# Patient Record
Sex: MALE | Race: WHITE | Employment: UNEMPLOYED | URBAN - METROPOLITAN AREA
[De-identification: names, ages, dates, MRNs, and addresses within clinical notes are randomized per-mention and may not be internally consistent; named-entity substitution may affect disease eponyms.]

---

## 2018-05-23 ENCOUNTER — NURSE ONLY (OUTPATIENT)
Dept: NON INVASIVE DIAGNOSTICS | Age: 58
End: 2018-05-23
Payer: COMMERCIAL

## 2018-05-23 ENCOUNTER — OFFICE VISIT (OUTPATIENT)
Dept: CARDIOLOGY CLINIC | Age: 58
End: 2018-05-23
Payer: COMMERCIAL

## 2018-05-23 VITALS
HEART RATE: 77 BPM | DIASTOLIC BLOOD PRESSURE: 84 MMHG | RESPIRATION RATE: 16 BRPM | WEIGHT: 282 LBS | SYSTOLIC BLOOD PRESSURE: 132 MMHG | BODY MASS INDEX: 39.48 KG/M2 | HEIGHT: 71 IN

## 2018-05-23 DIAGNOSIS — Z95.0 PACEMAKER: ICD-10-CM

## 2018-05-23 DIAGNOSIS — Z92.89 HISTORY OF EXERCISE STRESS TEST: ICD-10-CM

## 2018-05-23 DIAGNOSIS — E78.2 MIXED HYPERLIPIDEMIA: ICD-10-CM

## 2018-05-23 DIAGNOSIS — Z92.89 HISTORY OF ECHOCARDIOGRAM: ICD-10-CM

## 2018-05-23 DIAGNOSIS — I10 ESSENTIAL HYPERTENSION: ICD-10-CM

## 2018-05-23 DIAGNOSIS — Z95.0 PACEMAKER: Primary | ICD-10-CM

## 2018-05-23 PROCEDURE — 93000 ELECTROCARDIOGRAM COMPLETE: CPT | Performed by: INTERNAL MEDICINE

## 2018-05-23 PROCEDURE — 99204 OFFICE O/P NEW MOD 45 MIN: CPT | Performed by: INTERNAL MEDICINE

## 2018-05-23 PROCEDURE — 93280 PM DEVICE PROGR EVAL DUAL: CPT | Performed by: INTERNAL MEDICINE

## 2018-05-23 RX ORDER — ROSUVASTATIN CALCIUM 10 MG/1
5 TABLET, COATED ORAL DAILY
COMMUNITY

## 2018-05-23 RX ORDER — TAMSULOSIN HYDROCHLORIDE 0.4 MG/1
0.4 CAPSULE ORAL DAILY
COMMUNITY
Start: 2018-05-13

## 2018-05-23 RX ORDER — M-VIT,TX,IRON,MINS/CALC/FOLIC 27MG-0.4MG
1 TABLET ORAL DAILY
COMMUNITY

## 2018-10-19 NOTE — PROGRESS NOTES
any questions or concerns. I have spent a total of 45 minutes with the patient and the family reviewing the above stated recommendations. And a total of >50% of that time involved face-to-face time providing counseling and or coordination of care with the other providers. Thank you for allowing me to participate in your patient's care. Please call me if there are any questions or concerns.       Luis M Lunsford MD  Cardiac Electrophysiology  [facility]) Physicians  [facility] [place]: [facility]  8:30 AM  10/22/2018

## 2018-10-22 ENCOUNTER — OFFICE VISIT (OUTPATIENT)
Dept: NON INVASIVE DIAGNOSTICS | Age: 58
End: 2018-10-22
Payer: COMMERCIAL

## 2018-10-22 VITALS
DIASTOLIC BLOOD PRESSURE: 84 MMHG | WEIGHT: 281.6 LBS | HEIGHT: 71 IN | SYSTOLIC BLOOD PRESSURE: 128 MMHG | RESPIRATION RATE: 18 BRPM | BODY MASS INDEX: 39.42 KG/M2

## 2018-10-22 DIAGNOSIS — Z95.0 PACEMAKER: Primary | ICD-10-CM

## 2018-10-22 PROCEDURE — 99204 OFFICE O/P NEW MOD 45 MIN: CPT | Performed by: INTERNAL MEDICINE

## 2018-10-22 PROCEDURE — 93280 PM DEVICE PROGR EVAL DUAL: CPT | Performed by: INTERNAL MEDICINE

## 2019-06-03 NOTE — PROGRESS NOTES
[address] [facility] and [address] [facility] Electrophysiology  Consultation Report  PATIENT: Ian Gutierrez  MEDICAL RECORD NUMBER: <[id number]>  DATE OF SERVICE:  6/6/2019  ATTENDING ELECTROPHYSIOLOGIST: Cecil Petty MD  REFERRING PHYSICIAN: Cherry Magana MD  CHIEF COMPLAINT: Pacemaker in-situ management    HPI: This is a 62 y.o. male with a history of   Patient Active Problem List   Diagnosis    Essential hypertension    Mixed hyperlipidemia    History of echocardiogram    History of exercise stress test    Pacemaker   who presents to cardiac electrophysiology clinic for consultation of dual chamber permanent pacemaker management. The patient has had history of symptomatic sinus node dysfunction status post dual chamber permanent pacemaker implantation on 4/3/04 and generator change on 4/27/12. Since his last office visit Ms. Umanzor states he feels overall well. He offers no complaints from a device POV - his device site looks well healed and free from infection or erosion. He states states since the PPM insertion he denies any syncopal or dizzy episodes. The patient denies any chest pain, dyspnea, palpitations, dizziness, syncope, orthopnea or paroxysmal nocturnal dyspnea. Patient Active Problem List    Diagnosis Date Noted    Essential hypertension 05/23/2018    Mixed hyperlipidemia 05/23/2018    History of echocardiogram 05/23/2018     Overview Note:     A. Echo 4/2017 OSH): EF 55%, no valve disease      History of exercise stress test 05/23/2018     Overview Note:     A. Negative ETT-^ 2016 (OSH)      Pacemaker 05/23/2018     Overview Note:     A.  Original implant 2004,   Medtronic Adapta upgrade 4/27/2012 (OSH): for \"symptomatic 4.5 second pause\"         Past Medical History:   Diagnosis Date    Sleep apnea        Family History   Problem Relation Age of Onset    Cancer Mother         breast, mets    Heart Attack Father        Social History     Tobacco Use    Smoking status: Former Smoker     Packs/day: 2.50     Years: 32.00     Pack years: 80.00     Types: Cigarettes     Last attempt to quit: 5/23/2004     Years since quitting: 15.0    Smokeless tobacco: Never Used   Substance Use Topics    Alcohol use: Yes     Alcohol/week: 1.8 oz     Types: 3 Cans of beer per week     Comment: qod       Current Outpatient Medications   Medication Sig Dispense Refill    tamsulosin (FLOMAX) 0.4 MG capsule Take 0.4 mg by mouth daily       aspirin 81 MG tablet Take 81 mg by mouth daily      rosuvastatin (CRESTOR) 10 MG tablet Take 5 mg by mouth daily       Multiple Vitamins-Minerals (THERAPEUTIC MULTIVITAMIN-MINERALS) tablet Take 1 tablet by mouth daily       No current facility-administered medications for this visit. No Known Allergies    ROS:   Constitutional: Negative for fever, activity change and appetite change. HENT: Negative for epistaxis. Eyes: Negative for diploplia, blurred vision. Respiratory: Negative for cough, chest tightness, shortness of breath and wheezing. Cardiovascular: pertinent positives in HPI  Gastrointestinal: Negative for abdominal pain and blood in stool. All other review of systems are negative     PHYSICAL EXAM:   Vitals:    06/06/19 0833   BP: 122/80   Pulse: 91   Resp: 20   Weight: 293 lb (132.9 kg)   Height: 5' 10\" (1.778 m)      Constitutional: Well-developed, no acute distress  Eyes: conjunctivae normal, no xanthelasma   Ears, Nose, Throat: oral mucosa moist, no cyanosis   CV: no JVD. Regular rate and rhythm. Normal S1S2 and no S3. No murmurs, rubs, or gallops. PMI is nondisplaced  Lungs: clear to auscultation bilaterally, normal respiratory effort without used of accessory muscles  Abdomen: soft, non-tender, bowel sounds present, no masses or hepatomegaly   Musculoskeletal: no digital clubbing, no edema   Skin: warm, no rashes   Pacemaker pocket: well healed.  No erosion or infection or migration    I have personally encourage to call for any questions or concerns. I have spent a total of 30 minutes with the patient and the family reviewing the above stated recommendations. And a total of >50% of that time involved face-to-face time providing counseling and or coordination of care with the other providers. Thank you for allowing me to participate in your patient's care. Please call me if there are any questions or concerns.       Antonette Maxwell MD  Cardiac Electrophysiology  [address]   [facility] [place]: [facility]  6/6/19   8:45 AM

## 2019-06-06 ENCOUNTER — OFFICE VISIT (OUTPATIENT)
Dept: NON INVASIVE DIAGNOSTICS | Age: 59
End: 2019-06-06
Payer: COMMERCIAL

## 2019-06-06 VITALS
RESPIRATION RATE: 20 BRPM | BODY MASS INDEX: 41.95 KG/M2 | WEIGHT: 293 LBS | DIASTOLIC BLOOD PRESSURE: 80 MMHG | HEART RATE: 91 BPM | SYSTOLIC BLOOD PRESSURE: 122 MMHG | HEIGHT: 70 IN

## 2019-06-06 DIAGNOSIS — Z95.0 PACEMAKER: ICD-10-CM

## 2019-06-06 PROCEDURE — G8427 DOCREV CUR MEDS BY ELIG CLIN: HCPCS | Performed by: INTERNAL MEDICINE

## 2019-06-06 PROCEDURE — 99214 OFFICE O/P EST MOD 30 MIN: CPT | Performed by: INTERNAL MEDICINE

## 2019-06-06 PROCEDURE — 93280 PM DEVICE PROGR EVAL DUAL: CPT | Performed by: INTERNAL MEDICINE

## 2019-06-06 PROCEDURE — G8417 CALC BMI ABV UP PARAM F/U: HCPCS | Performed by: INTERNAL MEDICINE

## 2019-06-06 PROCEDURE — 1036F TOBACCO NON-USER: CPT | Performed by: INTERNAL MEDICINE

## 2019-06-06 PROCEDURE — 3017F COLORECTAL CA SCREEN DOC REV: CPT | Performed by: INTERNAL MEDICINE

## 2019-09-06 ENCOUNTER — TELEPHONE (OUTPATIENT)
Dept: NON INVASIVE DIAGNOSTICS | Age: 59
End: 2019-09-06

## 2019-09-12 ENCOUNTER — NURSE ONLY (OUTPATIENT)
Dept: NON INVASIVE DIAGNOSTICS | Age: 59
End: 2019-09-12
Payer: COMMERCIAL

## 2019-09-12 DIAGNOSIS — Z95.0 PACEMAKER: Primary | ICD-10-CM

## 2019-09-12 PROCEDURE — 93296 REM INTERROG EVL PM/IDS: CPT | Performed by: INTERNAL MEDICINE

## 2019-09-12 PROCEDURE — 93294 REM INTERROG EVL PM/LDLS PM: CPT | Performed by: INTERNAL MEDICINE

## 2019-12-17 ENCOUNTER — NURSE ONLY (OUTPATIENT)
Dept: NON INVASIVE DIAGNOSTICS | Age: 59
End: 2019-12-17
Payer: COMMERCIAL

## 2019-12-17 PROCEDURE — 93296 REM INTERROG EVL PM/IDS: CPT | Performed by: INTERNAL MEDICINE

## 2019-12-17 PROCEDURE — 93294 REM INTERROG EVL PM/LDLS PM: CPT | Performed by: INTERNAL MEDICINE

## 2020-01-14 ENCOUNTER — TELEPHONE (OUTPATIENT)
Dept: NON INVASIVE DIAGNOSTICS | Age: 60
End: 2020-01-14

## 2020-01-14 NOTE — TELEPHONE ENCOUNTER
We have received your remote transmission. Our staff will contact you if there is anything that needs to be discussed. Your next appointment is March 17, 2020 for remote transmission. Patient verbalized understanding.

## 2020-03-18 ENCOUNTER — NURSE ONLY (OUTPATIENT)
Dept: NON INVASIVE DIAGNOSTICS | Age: 60
End: 2020-03-18
Payer: COMMERCIAL

## 2020-03-18 PROCEDURE — 93294 REM INTERROG EVL PM/LDLS PM: CPT | Performed by: INTERNAL MEDICINE

## 2020-03-18 PROCEDURE — 93296 REM INTERROG EVL PM/IDS: CPT | Performed by: INTERNAL MEDICINE

## 2020-03-27 NOTE — PROGRESS NOTES
See [employer] [facility] report. Remote monitoring reviewed over a 90 day period. End of 90 day monitoring period date of service 3-18-20.

## 2020-03-30 ENCOUNTER — TELEPHONE (OUTPATIENT)
Dept: NON INVASIVE DIAGNOSTICS | Age: 60
End: 2020-03-30

## 2020-03-30 NOTE — TELEPHONE ENCOUNTER
Spoke with the patient. We have received your remote transmission. Our staff will contact you if there is anything that needs to be discussed. Your next appointment is June 17, 2020 for remote transmission. Voiced understanding.

## 2020-06-17 ENCOUNTER — NURSE ONLY (OUTPATIENT)
Dept: NON INVASIVE DIAGNOSTICS | Age: 60
End: 2020-06-17
Payer: COMMERCIAL

## 2020-06-17 PROCEDURE — 93294 REM INTERROG EVL PM/LDLS PM: CPT | Performed by: INTERNAL MEDICINE

## 2020-06-17 PROCEDURE — 93296 REM INTERROG EVL PM/IDS: CPT | Performed by: INTERNAL MEDICINE

## 2020-06-18 ENCOUNTER — TELEPHONE (OUTPATIENT)
Dept: NON INVASIVE DIAGNOSTICS | Age: 60
End: 2020-06-18

## 2021-01-18 ENCOUNTER — TELEPHONE (OUTPATIENT)
Dept: NON INVASIVE DIAGNOSTICS | Age: 61
End: 2021-01-18

## 2021-01-18 NOTE — TELEPHONE ENCOUNTER
Patient was due 09/22/2020 and has not yet set a remote transmission to the office. Mailed the patient a letter to remote them to hook up their monitor and send us a remote transmission.

## 2021-03-10 ENCOUNTER — OFFICE VISIT (OUTPATIENT)
Dept: NON INVASIVE DIAGNOSTICS | Age: 61
End: 2021-03-10

## 2021-03-10 VITALS
OXYGEN SATURATION: 95 % | BODY MASS INDEX: 39.93 KG/M2 | RESPIRATION RATE: 18 BRPM | HEART RATE: 104 BPM | HEIGHT: 71 IN | DIASTOLIC BLOOD PRESSURE: 86 MMHG | WEIGHT: 285.2 LBS | SYSTOLIC BLOOD PRESSURE: 138 MMHG

## 2021-03-10 DIAGNOSIS — I49.5 SINUS NODE DYSFUNCTION (HCC): ICD-10-CM

## 2021-03-10 DIAGNOSIS — R55 NEAR SYNCOPE: ICD-10-CM

## 2021-03-10 DIAGNOSIS — R42 LIGHTHEADED: Primary | ICD-10-CM

## 2021-03-10 PROCEDURE — 99213 OFFICE O/P EST LOW 20 MIN: CPT | Performed by: NURSE PRACTITIONER

## 2021-03-10 PROCEDURE — 93280 PM DEVICE PROGR EVAL DUAL: CPT | Performed by: NURSE PRACTITIONER

## 2021-03-10 NOTE — PROGRESS NOTES
[address] [facility],[address] and [address] [facility] Electrophysiology  Consultation Report  PATIENT: Ro Verdin  MEDICAL RECORD NUMBER: <[id number]>  DATE OF SERVICE:  3/10/2021  ATTENDING ELECTROPHYSIOLOGIST: Cinthya Concepcion MD  REFERRING PHYSICIAN: Cristiana Cole MD  CHIEF COMPLAINT: Pacemaker in-situ management    HPI: This is a 61 y.o. male with a history of   Patient Active Problem List   Diagnosis    Essential hypertension    Mixed hyperlipidemia    History of echocardiogram    History of exercise stress test    Pacemaker   who presents to cardiac electrophysiology clinic for Follow-up of dual chamber permanent pacemaker management secondary to sinus node dysfunction. He was last seen in Clinic with Dr. Darren Ventura on 06/06/19 and was doing well at that time. Today he complaints of malfunctioning of the Medtronic remote as well as lightheadedness, fogginess with exertion this has been persistent for approximately 6 months and is not worsening in frequency or severity, he has no complaints of chest pain, full syncope, orthopnea, bilateral lower extremity edema. The device site is well-healed without erosion or migration. He is atrial pacing 17% of the time, less than 1% RV pacing. Patient Active Problem List    Diagnosis Date Noted    Essential hypertension 05/23/2018    Mixed hyperlipidemia 05/23/2018    History of echocardiogram 05/23/2018     Overview Note:     A. Echo 4/2017 OSH): EF 55%, no valve disease      History of exercise stress test 05/23/2018     Overview Note:     A. Negative ETT-^ 2016 (OSH)      Pacemaker 05/23/2018     Overview Note:     A.  Original implant 2004,   Medtronic Adapta upgrade 4/27/2012 (OSH): for \"symptomatic 4.5 second pause\"         Past Medical History:   Diagnosis Date    Sleep apnea        Family History   Problem Relation Age of Onset    Cancer Mother         breast, mets    Heart Attack Father        Social History     Tobacco Use    Smoking status: Former Smoker     Packs/day: 2.50     Years: 32.00     Pack years: 80.00     Types: Cigarettes     Quit date: 2004     Years since quittin.8    Smokeless tobacco: Never Used   Substance Use Topics    Alcohol use: Yes     Alcohol/week: 3.0 standard drinks     Types: 3 Cans of beer per week     Comment: qod       Current Outpatient Medications   Medication Sig Dispense Refill    tamsulosin (FLOMAX) 0.4 MG capsule Take 0.4 mg by mouth daily       aspirin 81 MG tablet Take 81 mg by mouth daily      rosuvastatin (CRESTOR) 10 MG tablet Take 5 mg by mouth daily       Multiple Vitamins-Minerals (THERAPEUTIC MULTIVITAMIN-MINERALS) tablet Take 1 tablet by mouth daily       No current facility-administered medications for this visit. No Known Allergies    ROS:   Constitutional: Negative for fever, activity change and appetite change. HENT: Negative for epistaxis. Eyes: Negative for diploplia, blurred vision. Respiratory: Negative for cough, chest tightness, shortness of breath and wheezing. Cardiovascular: pertinent positives in HPI  Gastrointestinal: Negative for abdominal pain and blood in stool. All other review of systems are negative     PHYSICAL EXAM:   There were no vitals filed for this visit. Constitutional: Well-developed, no acute distress  Eyes: conjunctivae normal, no xanthelasma   Ears, Nose, Throat: oral mucosa moist, no cyanosis   CV: no JVD. Regular rate and rhythm. Normal S1S2 and no S3. No murmurs, rubs, or gallops. PMI is nondisplaced  Lungs: clear to auscultation bilaterally, normal respiratory effort without used of accessory muscles  Abdomen: soft, non-tender, bowel sounds present, no masses or hepatomegaly   Musculoskeletal: no digital clubbing, no edema   Skin: warm, no rashes   Pacemaker pocket: well healed.  No erosion or infection or migration    I have personally reviewed the laboratory, cardiac diagnostic and radiographic testing as months with Dr. Allison Hart and encourage to call for any questions or concerns. I have spent a total of 25 minutes with the patient and the family reviewing the above stated recommendations. And a total of >50% of that time involved face-to-face time providing counseling and or coordination of care with the other providers. Thank you for allowing me to participate in your patient's care. Please call me if there are any questions or concerns.       Electronically signed by Bayard Brittle, APRN - CNP on 3/10/2021 at 10:05 AM   Cardiac Electrophysiology  [address]  [facility] [place]: [facility]  3/10/21   10:04 AM

## 2021-05-17 ENCOUNTER — TELEPHONE (OUTPATIENT)
Dept: CARDIOLOGY | Age: 61
End: 2021-05-17

## 2021-05-17 NOTE — TELEPHONE ENCOUNTER
CALLED PATIENT AND LEFT MESSAGE TO RESCHEDULE ECHO THAT WAS SCHEDULED FOR THIS MORNING.  PATIENT WAS A NO SHOW    Electronically signed by Jose R Clinton on 5/17/2021 at 9:13 AM

## 2021-06-04 ENCOUNTER — TELEPHONE (OUTPATIENT)
Dept: CARDIOLOGY | Age: 61
End: 2021-06-04

## 2021-06-07 ENCOUNTER — HOSPITAL ENCOUNTER (OUTPATIENT)
Dept: CARDIOLOGY | Age: 61
Discharge: HOME | End: 2021-06-07
Payer: COMMERCIAL

## 2021-06-07 DIAGNOSIS — R42 LIGHTHEADED: ICD-10-CM

## 2021-06-07 DIAGNOSIS — R55 NEAR SYNCOPE: ICD-10-CM

## 2021-06-07 LAB
LV EF: 65 %
LVEF MODALITY: NORMAL

## 2021-06-07 PROCEDURE — 93306 TTE W/DOPPLER COMPLETE: CPT | Performed by: PSYCHIATRY & NEUROLOGY

## 2021-06-08 ENCOUNTER — TELEPHONE (OUTPATIENT)
Dept: NON INVASIVE DIAGNOSTICS | Age: 61
End: 2021-06-08

## 2021-12-15 ENCOUNTER — OFFICE VISIT (OUTPATIENT)
Dept: PODIATRY | Age: 61
End: 2021-12-15
Payer: COMMERCIAL

## 2021-12-15 VITALS — WEIGHT: 285 LBS | BODY MASS INDEX: 39.9 KG/M2 | HEIGHT: 71 IN

## 2021-12-15 DIAGNOSIS — R26.2 DIFFICULTY WALKING: ICD-10-CM

## 2021-12-15 DIAGNOSIS — R60.0 LOCALIZED EDEMA: ICD-10-CM

## 2021-12-15 DIAGNOSIS — M79.672 LEFT FOOT PAIN: ICD-10-CM

## 2021-12-15 DIAGNOSIS — M19.072 ARTHRITIS OF LEFT FOOT: Primary | ICD-10-CM

## 2021-12-15 DIAGNOSIS — M77.52 TENDINITIS OF LEFT FOOT: ICD-10-CM

## 2021-12-15 PROCEDURE — 99203 OFFICE O/P NEW LOW 30 MIN: CPT | Performed by: PODIATRIST

## 2021-12-15 PROCEDURE — 29580 STRAPPING UNNA BOOT: CPT | Performed by: PODIATRIST

## 2021-12-15 RX ORDER — ROSUVASTATIN CALCIUM 5 MG/1
TABLET, COATED ORAL
COMMUNITY
Start: 2021-10-22 | End: 2022-02-25 | Stop reason: ALTCHOICE

## 2021-12-15 NOTE — PROGRESS NOTES
12/15/21     Manolo Ramey    : 1960 Sex: male   Age: 64 y.o. Patient was referred by: None  Patient's PCP/Provider is:  Nancy Schroeder MD    Subjective:    Patient seen today for evaluation regarding left foot pain and swelling    Chief Complaint   Patient presents with    Foot Pain     left foot        HPI: Patient stated the issues been going on over the last several weeks and progressively gotten worse. Patient noticed some increased swelling and cramping into his left forefoot region with certain activities. Patient denies any recent injury to the left lower extremity. No other additional abnormalities noted. ROS:  Const: Positives and pertinent negatives as per HPI. Musculo: Denies symptoms other than stated above. Neuro: Denies symptoms other than stated above. Skin: Denies symptoms other than stated above.     Current Medications:    Current Outpatient Medications:     tamsulosin (FLOMAX) 0.4 MG capsule, Take 0.4 mg by mouth daily , Disp: , Rfl:     aspirin 81 MG tablet, Take 81 mg by mouth daily, Disp: , Rfl:     rosuvastatin (CRESTOR) 10 MG tablet, Take 5 mg by mouth daily , Disp: , Rfl:     Multiple Vitamins-Minerals (THERAPEUTIC MULTIVITAMIN-MINERALS) tablet, Take 1 tablet by mouth daily, Disp: , Rfl:     rosuvastatin (CRESTOR) 5 MG tablet, , Disp: , Rfl:     Allergies:  No Known Allergies    Vitals:    12/15/21 0939   Weight: 285 lb (129.3 kg)   Height: 5' 11\" (1.803 m)        Past Medical History:   Diagnosis Date    Sleep apnea      Family History   Problem Relation Age of Onset    Cancer Mother         breast, mets    Heart Attack Father      Past Surgical History:   Procedure Laterality Date    PACEMAKER INSERTION       Social History     Tobacco Use    Smoking status: Former Smoker     Packs/day: 2.50     Years: 32.00     Pack years: 80.00     Types: Cigarettes     Quit date: 2004     Years since quittin.5    Smokeless tobacco: Never Used Vaping Use    Vaping Use: Never used   Substance Use Topics    Alcohol use: Yes     Alcohol/week: 3.0 standard drinks     Types: 3 Cans of beer per week     Comment: qod    Drug use: No           Diagnostic studies:    No results found. Procedures: An unna boot  compressive wrap was applied to the left lower extremity. It's purpose is to  decrease  the amount of edema present, and to allow proper healing of the soft tissues. The patient was instructed to keep the unna boot clean, dry and intact until the next follow up visit. The patient was instructed to look for signs of redness, irritation, blistering and pain. If these or any other symptoms were to develop, they were advised to contact the office immediately for reevaluation. Exam:  VASCULAR: Pedal pulses palpable left foot. Capillary refill time brisk digits 1 through 5 left foot  NEUROLOGICAL: Epicritic sensations intact left lower extremity  DERMATOLOGICAL: Mild edematous issues noted to the dorsal left midfoot region without ecchymotic skin changes present. No skin abrasions or any signs of infection noted left lower extremity. MUSCULOSKELETAL: Tenderness noted with active range of motion left ankle and subtalar joint along the dorsal extensor tendon apparatus. Mild antalgic gait noted upon evaluation. Plan Per Assessment  Russ Rueda was seen today for foot pain. Diagnoses and all orders for this visit:    Arthritis of left foot    Tendinitis of left foot    Localized edema    Left foot pain  -     XR FOOT LEFT (MIN 3 VIEWS); Future    Difficulty walking        1. New patient evaluation and management  2. We did order and review x-rays 3 views left foot. Significant dorsal osteoarthritic changes noted midfoot region left. 3. Compression dressing was applied to the symptomatic left lower extremity as described above.   We did recommend purchasing an OTC compression sock to be worn with his every day work activities to prevent symptom reoccurrence. Patient is to continue with his current OTC insoles as well with his current activities. 4. Patient will be followed up at a later date if any further Podiatric issues arise. Seen By:    Kwabena Fuentes DPM    Electronically signed by Kwabena Fuentes DPM on 12/15/2021 at 10:18 AM      This note was created using voice recognition software. The note was reviewed however may contain grammatical errors.

## 2021-12-15 NOTE — PROGRESS NOTES
Patient is here for left foot pain. Patient states throbbing while elevating or in the car. Patient has been using Voltaren gel and has noticed a difference.  Wei Zuluaga MD 10/15/2021  Electronically signed by Makenzie Cameron LPN on 47/11/1645 at 9:41 AM

## 2023-08-13 PROCEDURE — 93296 REM INTERROG EVL PM/IDS: CPT | Performed by: INTERNAL MEDICINE

## 2023-08-13 PROCEDURE — 93294 REM INTERROG EVL PM/LDLS PM: CPT | Performed by: INTERNAL MEDICINE

## 2023-11-09 ENCOUNTER — HOSPITAL ENCOUNTER (EMERGENCY)
Age: 63
Discharge: LEFT BEFORE DISCHARGE | End: 2023-11-09
Payer: COMMERCIAL

## 2023-11-09 ENCOUNTER — OFFICE VISIT (OUTPATIENT)
Dept: NON INVASIVE DIAGNOSTICS | Age: 63
End: 2023-11-09

## 2023-11-09 VITALS
HEART RATE: 102 BPM | HEIGHT: 69 IN | SYSTOLIC BLOOD PRESSURE: 160 MMHG | RESPIRATION RATE: 20 BRPM | OXYGEN SATURATION: 94 % | BODY MASS INDEX: 41.47 KG/M2 | WEIGHT: 280 LBS | DIASTOLIC BLOOD PRESSURE: 97 MMHG | TEMPERATURE: 97.6 F

## 2023-11-09 VITALS
DIASTOLIC BLOOD PRESSURE: 96 MMHG | RESPIRATION RATE: 16 BRPM | SYSTOLIC BLOOD PRESSURE: 150 MMHG | HEIGHT: 69 IN | HEART RATE: 101 BPM | WEIGHT: 285 LBS | BODY MASS INDEX: 42.21 KG/M2 | OXYGEN SATURATION: 96 %

## 2023-11-09 DIAGNOSIS — R94.31 EKG ABNORMALITIES: ICD-10-CM

## 2023-11-09 DIAGNOSIS — Z95.0 PACEMAKER: Primary | ICD-10-CM

## 2023-11-09 LAB
EKG ATRIAL RATE: 94 BPM
EKG P AXIS: 48 DEGREES
EKG P-R INTERVAL: 170 MS
EKG Q-T INTERVAL: 334 MS
EKG QRS DURATION: 98 MS
EKG QTC CALCULATION (BAZETT): 417 MS
EKG R AXIS: -44 DEGREES
EKG T AXIS: 29 DEGREES
EKG VENTRICULAR RATE: 94 BPM

## 2023-11-09 PROCEDURE — 93005 ELECTROCARDIOGRAM TRACING: CPT | Performed by: STUDENT IN AN ORGANIZED HEALTH CARE EDUCATION/TRAINING PROGRAM

## 2023-11-09 PROCEDURE — 93010 ELECTROCARDIOGRAM REPORT: CPT | Performed by: INTERNAL MEDICINE

## 2023-11-09 RX ORDER — REGADENOSON 0.08 MG/ML
0.4 INJECTION, SOLUTION INTRAVENOUS
OUTPATIENT
Start: 2023-11-09

## 2023-11-09 RX ORDER — METOPROLOL SUCCINATE 50 MG/1
50 TABLET, EXTENDED RELEASE ORAL DAILY
Qty: 30 TABLET | Refills: 3 | Status: SHIPPED | OUTPATIENT
Start: 2023-11-09

## 2023-11-09 NOTE — PROGRESS NOTES
[address] [facility] and [facility] Electrophysiology  Outpatient Progress Note  PATIENT: [address]  MEDICAL RECORD NUMBER: [id number]  DATE OF SERVICE:  11/9/2023  ATTENDING ELECTROPHYSIOLOGIST: Georges Moralez MD  REFERRING PHYSICIAN: Eliud Carey MD  CHIEF COMPLAINT: Pacemaker in situ     HPI: This is a 61 y.o. male with a history of   Patient Active Problem List   Diagnosis    Essential hypertension    Mixed hyperlipidemia    History of echocardiogram    History of exercise stress test    Pacemaker    Sinus node dysfunction ([address])    Arthritis of left foot   who presents to cardiac electrophysiology clinic for follow-up and management of dual chamber permanent pacemaker management secondary to sinus node dysfunction. He was last seen in the clinic with Terence Seen, ELBA on 3/10/2021. Since last office visit the patient reports feeling palpitations, neck pain and SOB. He reports his occurred 3 times, last night, yesterday morning and on October 27, 2023. These episodes lasted 5 to 30 minutes. He offers no complaints from a device POV. The device site looks well healed and free from infection or erosion. The patient denies any chest pain, dizziness, syncope, orthopnea or paroxysmal nocturnal dyspnea. The patient continues to be followed remotely. Patient Active Problem List    Diagnosis Date Noted    Arthritis of left foot 12/15/2021    Sinus node dysfunction ([address]) 03/10/2021    Essential hypertension 05/23/2018    Mixed hyperlipidemia 05/23/2018    History of echocardiogram 05/23/2018     Overview Note:     A. Echo 4/2017 OSH): EF 55%, no valve disease      History of exercise stress test 05/23/2018     Overview Note:     A. Negative ETT-^ 2016 (OSH)      Pacemaker 05/23/2018     Overview Note:     A.  Original implant 2004,   Medtronic Adapta upgrade 4/27/2012 (OSH): for \"symptomatic 4.5 second pause\"         Past Medical History:   Diagnosis Date    Sleep apnea

## 2023-11-09 NOTE — ED NOTES
Pt states that he will see his cardiologist in the morning. Explained to pt that anything up to and including death could occur by leaving.       Stephon Browne RN  11/09/23 4778

## 2023-11-16 ENCOUNTER — TELEPHONE (OUTPATIENT)
Dept: CARDIOLOGY | Age: 63
End: 2023-11-16

## 2023-11-16 NOTE — TELEPHONE ENCOUNTER
Spoke with patient and confirmed chemical stress test appointment on 11/20/23 at 0730. Instructions for test reviewed with patient, questions answered. Patient verbalized understanding.

## 2023-11-20 ENCOUNTER — HOSPITAL ENCOUNTER (OUTPATIENT)
Dept: CARDIOLOGY | Age: 63
Discharge: HOME | End: 2023-11-22
Attending: INTERNAL MEDICINE
Payer: COMMERCIAL

## 2023-11-20 VITALS
WEIGHT: 285 LBS | HEART RATE: 74 BPM | BODY MASS INDEX: 42.21 KG/M2 | HEIGHT: 69 IN | SYSTOLIC BLOOD PRESSURE: 138 MMHG | RESPIRATION RATE: 16 BRPM | DIASTOLIC BLOOD PRESSURE: 84 MMHG

## 2023-11-20 DIAGNOSIS — R94.31 EKG ABNORMALITIES: ICD-10-CM

## 2023-11-20 LAB
ECHO BSA: 2.51 M2
NUC STRESS EJECTION FRACTION: 64 %
STRESS BASELINE DIAS BP: 84 MMHG
STRESS BASELINE HR: 74 BPM
STRESS BASELINE SYS BP: 138 MMHG
STRESS ESTIMATED WORKLOAD: 1.1 METS
STRESS O2 SAT REST: 96 %
STRESS PEAK DIAS BP: 80 MMHG
STRESS PEAK SYS BP: 144 MMHG
STRESS PERCENT HR ACHIEVED: 72 %
STRESS POST PEAK HR: 113 BPM
STRESS RATE PRESSURE PRODUCT: NORMAL BPM*MMHG
STRESS TARGET HR: 157 BPM

## 2023-11-20 PROCEDURE — 93017 CV STRESS TEST TRACING ONLY: CPT

## 2023-11-20 PROCEDURE — 6360000002 HC RX W HCPCS: Performed by: INTERNAL MEDICINE

## 2023-11-20 PROCEDURE — A9500 TC99M SESTAMIBI: HCPCS | Performed by: INTERNAL MEDICINE

## 2023-11-20 PROCEDURE — 3430000000 HC RX DIAGNOSTIC RADIOPHARMACEUTICAL: Performed by: INTERNAL MEDICINE

## 2023-11-20 PROCEDURE — 2580000003 HC RX 258: Performed by: INTERNAL MEDICINE

## 2023-11-20 RX ORDER — REGADENOSON 0.08 MG/ML
0.4 INJECTION, SOLUTION INTRAVENOUS
Status: COMPLETED | OUTPATIENT
Start: 2023-11-20 | End: 2023-11-20

## 2023-11-20 RX ORDER — SODIUM CHLORIDE 0.9 % (FLUSH) 0.9 %
10 SYRINGE (ML) INJECTION PRN
Status: DISCONTINUED | OUTPATIENT
Start: 2023-11-20 | End: 2023-11-23 | Stop reason: HOSPADM

## 2023-11-20 RX ORDER — TETRAKIS(2-METHOXYISOBUTYLISOCYANIDE)COPPER(I) TETRAFLUOROBORATE 1 MG/ML
33.2 INJECTION, POWDER, LYOPHILIZED, FOR SOLUTION INTRAVENOUS ONCE
Status: COMPLETED | OUTPATIENT
Start: 2023-11-20 | End: 2023-11-20

## 2023-11-20 RX ORDER — TETRAKIS(2-METHOXYISOBUTYLISOCYANIDE)COPPER(I) TETRAFLUOROBORATE 1 MG/ML
10 INJECTION, POWDER, LYOPHILIZED, FOR SOLUTION INTRAVENOUS
Status: COMPLETED | OUTPATIENT
Start: 2023-11-20 | End: 2023-11-20

## 2023-11-20 RX ADMIN — SODIUM CHLORIDE, PRESERVATIVE FREE 10 ML: 5 INJECTION INTRAVENOUS at 09:19

## 2023-11-20 RX ADMIN — Medication 10 MILLICURIE: at 07:41

## 2023-11-20 RX ADMIN — Medication 33.2 MILLICURIE: at 09:18

## 2023-11-20 RX ADMIN — REGADENOSON 0.4 MG: 0.08 INJECTION, SOLUTION INTRAVENOUS at 09:18

## 2023-11-20 RX ADMIN — SODIUM CHLORIDE, PRESERVATIVE FREE 10 ML: 5 INJECTION INTRAVENOUS at 09:18

## 2023-11-20 RX ADMIN — SODIUM CHLORIDE, PRESERVATIVE FREE 10 ML: 5 INJECTION INTRAVENOUS at 07:41

## 2023-11-21 ENCOUNTER — TELEPHONE (OUTPATIENT)
Dept: NON INVASIVE DIAGNOSTICS | Age: 63
End: 2023-11-21

## 2023-11-21 ENCOUNTER — HOSPITAL ENCOUNTER (OUTPATIENT)
Dept: CARDIOLOGY | Age: 63
Discharge: HOME | End: 2023-11-23
Attending: INTERNAL MEDICINE
Payer: COMMERCIAL

## 2023-11-21 VITALS
BODY MASS INDEX: 42.21 KG/M2 | HEIGHT: 69 IN | SYSTOLIC BLOOD PRESSURE: 138 MMHG | WEIGHT: 285 LBS | DIASTOLIC BLOOD PRESSURE: 84 MMHG

## 2023-11-21 DIAGNOSIS — R94.31 EKG ABNORMALITIES: ICD-10-CM

## 2023-11-21 LAB
ECHO AO ASC DIAM: 3.5 CM
ECHO AO ASCENDING AORTA INDEX: 1.46 CM/M2
ECHO AV AREA PEAK VELOCITY: 2.2 CM2
ECHO AV AREA VTI: 2.2 CM2
ECHO AV AREA/BSA PEAK VELOCITY: 0.9 CM2/M2
ECHO AV AREA/BSA VTI: 0.9 CM2/M2
ECHO AV CUSP MM: 1.6 CM
ECHO AV MEAN GRADIENT: 7 MMHG
ECHO AV MEAN VELOCITY: 1.3 M/S
ECHO AV PEAK GRADIENT: 16 MMHG
ECHO AV PEAK VELOCITY: 2 M/S
ECHO AV VELOCITY RATIO: 0.6
ECHO AV VTI: 39.9 CM
ECHO BSA: 2.51 M2
ECHO EST RA PRESSURE: 3 MMHG
ECHO LA DIAMETER INDEX: 1.71 CM/M2
ECHO LA DIAMETER: 4.1 CM
ECHO LA VOL A-L A2C: 50 ML (ref 18–58)
ECHO LA VOL A-L A4C: 61 ML (ref 18–58)
ECHO LA VOL MOD A2C: 48 ML (ref 18–58)
ECHO LA VOL MOD A4C: 62 ML (ref 18–58)
ECHO LA VOLUME AREA LENGTH: 58 ML
ECHO LA VOLUME INDEX A-L A2C: 21 ML/M2 (ref 16–34)
ECHO LA VOLUME INDEX A-L A4C: 25 ML/M2 (ref 16–34)
ECHO LA VOLUME INDEX AREA LENGTH: 24 ML/M2 (ref 16–34)
ECHO LA VOLUME INDEX MOD A2C: 20 ML/M2 (ref 16–34)
ECHO LA VOLUME INDEX MOD A4C: 26 ML/M2 (ref 16–34)
ECHO LV E' LATERAL VELOCITY: 8 CM/S
ECHO LV E' SEPTAL VELOCITY: 5 CM/S
ECHO LV EDV A2C: 128 ML
ECHO LV EDV A4C: 152 ML
ECHO LV EDV BP: 144 ML (ref 67–155)
ECHO LV EDV INDEX A4C: 63 ML/M2
ECHO LV EDV INDEX BP: 60 ML/M2
ECHO LV EDV NDEX A2C: 53 ML/M2
ECHO LV EF PHYSICIAN: 60 %
ECHO LV EJECTION FRACTION A2C: 64 %
ECHO LV EJECTION FRACTION A4C: 62 %
ECHO LV EJECTION FRACTION BIPLANE: 62 % (ref 55–100)
ECHO LV ESV A2C: 46 ML
ECHO LV ESV A4C: 59 ML
ECHO LV ESV BP: 54 ML (ref 22–58)
ECHO LV ESV INDEX A2C: 19 ML/M2
ECHO LV ESV INDEX A4C: 25 ML/M2
ECHO LV ESV INDEX BP: 23 ML/M2
ECHO LV FRACTIONAL SHORTENING: 33 % (ref 28–44)
ECHO LV INTERNAL DIMENSION DIASTOLE INDEX: 1.88 CM/M2
ECHO LV INTERNAL DIMENSION DIASTOLIC: 4.5 CM (ref 4.2–5.9)
ECHO LV INTERNAL DIMENSION SYSTOLIC INDEX: 1.25 CM/M2
ECHO LV INTERNAL DIMENSION SYSTOLIC: 3 CM
ECHO LV ISOVOLUMETRIC RELAXATION TIME (IVRT): 83 MS
ECHO LV IVSD: 1.1 CM (ref 0.6–1)
ECHO LV IVSS: 1.5 CM
ECHO LV MASS 2D: 175 G (ref 88–224)
ECHO LV MASS INDEX 2D: 72.9 G/M2 (ref 49–115)
ECHO LV POSTERIOR WALL DIASTOLIC: 1.1 CM (ref 0.6–1)
ECHO LV POSTERIOR WALL SYSTOLIC: 1.7 CM
ECHO LV RELATIVE WALL THICKNESS RATIO: 0.49
ECHO LVOT AREA: 3.8 CM2
ECHO LVOT AV VTI INDEX: 0.6
ECHO LVOT DIAM: 2.2 CM
ECHO LVOT MEAN GRADIENT: 2 MMHG
ECHO LVOT PEAK GRADIENT: 6 MMHG
ECHO LVOT PEAK VELOCITY: 1.2 M/S
ECHO LVOT STROKE VOLUME INDEX: 38.2 ML/M2
ECHO LVOT SV: 91.6 ML
ECHO LVOT VTI: 24.1 CM
ECHO MV "A" WAVE DURATION: 143 MSEC
ECHO MV A VELOCITY: 1 M/S
ECHO MV AREA PHT: 3.3 CM2
ECHO MV AREA VTI: 3.5 CM2
ECHO MV E DECELERATION TIME (DT): 288 MS
ECHO MV E VELOCITY: 0.82 M/S
ECHO MV E/A RATIO: 0.82
ECHO MV E/E' LATERAL: 10.25
ECHO MV E/E' RATIO (AVERAGED): 13.33
ECHO MV LVOT VTI INDEX: 1.1
ECHO MV MAX VELOCITY: 1.1 M/S
ECHO MV MEAN GRADIENT: 2 MMHG
ECHO MV MEAN VELOCITY: 0.6 M/S
ECHO MV PEAK GRADIENT: 5 MMHG
ECHO MV PRESSURE HALF TIME (PHT): 66.7 MS
ECHO MV VTI: 26.4 CM
ECHO PV MAX VELOCITY: 1.2 M/S
ECHO PV MEAN GRADIENT: 3 MMHG
ECHO PV MEAN VELOCITY: 0.8 M/S
ECHO PV PEAK GRADIENT: 6 MMHG
ECHO PV VTI: 24.1 CM
ECHO PVEIN A DURATION: 115.3 MS
ECHO PVEIN A VELOCITY: 0.2 M/S
ECHO PVEIN PEAK D VELOCITY: 0.3 M/S
ECHO PVEIN PEAK S VELOCITY: 0.5 M/S
ECHO PVEIN S/D RATIO: 1.7
ECHO RIGHT VENTRICULAR SYSTOLIC PRESSURE (RVSP): 40 MMHG
ECHO RV INTERNAL DIMENSION: 3.5 CM
ECHO RV TAPSE: 2.8 CM (ref 1.7–?)
ECHO TV REGURGITANT MAX VELOCITY: 3.04 M/S
ECHO TV REGURGITANT PEAK GRADIENT: 37 MMHG

## 2023-11-21 PROCEDURE — 6360000004 HC RX CONTRAST MEDICATION: Performed by: INTERNAL MEDICINE

## 2023-11-21 PROCEDURE — 2580000003 HC RX 258: Performed by: INTERNAL MEDICINE

## 2023-11-21 PROCEDURE — C8929 TTE W OR WO FOL WCON,DOPPLER: HCPCS

## 2023-11-21 PROCEDURE — 93306 TTE W/DOPPLER COMPLETE: CPT | Performed by: INTERNAL MEDICINE

## 2023-11-21 RX ORDER — SODIUM CHLORIDE 0.9 % (FLUSH) 0.9 %
10 SYRINGE (ML) INJECTION PRN
Status: DISCONTINUED | OUTPATIENT
Start: 2023-11-21 | End: 2023-11-24 | Stop reason: HOSPADM

## 2023-11-21 RX ADMIN — SODIUM CHLORIDE, PRESERVATIVE FREE 10 ML: 5 INJECTION INTRAVENOUS at 10:16

## 2023-11-21 RX ADMIN — PERFLUTREN 1.5 ML: 6.52 INJECTION, SUSPENSION INTRAVENOUS at 10:16

## 2023-11-21 NOTE — TELEPHONE ENCOUNTER
----- Message from Katharina Ulrich MD sent at 11/20/2023  7:10 PM EST -----  Stress test showed possible scar and normal LV EF. Await for echo results.  Thanks.  ----- Message -----  From: Boogie Laird MD  Sent: 11/20/2023   1:26 PM EST  To: Katharina Ulrich MD

## 2023-11-21 NOTE — TELEPHONE ENCOUNTER
LM to call office for results.     Electronically signed by Taylor Grace MA on 11/21/2023 at 10:02 AM

## 2023-11-22 ENCOUNTER — TELEPHONE (OUTPATIENT)
Dept: NON INVASIVE DIAGNOSTICS | Age: 63
End: 2023-11-22

## 2023-11-22 NOTE — TELEPHONE ENCOUNTER
Pt notified of results and verbalized understanding.     Electronically signed by Titus Magaña MA on 11/22/2023 at 9:50 AM

## 2023-11-22 NOTE — TELEPHONE ENCOUNTER
----- Message from Fernando Willard MD sent at 11/21/2023  4:31 PM EST -----  Echo showed normal LV function.  Thanks.  ----- Message -----  From: Ayden West MD  Sent: 11/21/2023   4:24 PM EST  To: Fernando Willard MD

## 2023-11-29 ENCOUNTER — TELEPHONE (OUTPATIENT)
Dept: NON INVASIVE DIAGNOSTICS | Age: 63
End: 2023-11-29

## 2023-11-29 DIAGNOSIS — R94.31 EKG ABNORMALITIES: ICD-10-CM

## 2023-11-29 NOTE — TELEPHONE ENCOUNTER
----- Message from Rose Romo MD sent at 11/29/2023  8:17 AM EST -----  Cardiac monitor showed 5 Supraventricular Tachycardia runs occurred, the run with the longest lasting 8 beats. No VT or PAF. No pause or AV block. Triggered event and reported symptoms were correlated with sinus rhythm and sinus tachycardia.  Thanks.  ----- Message -----  From: Rebecca Hernandez MA  Sent: 11/29/2023   8:12 AM EST  To: Rose Romo MD

## 2023-11-29 NOTE — TELEPHONE ENCOUNTER
Pt notified of results and verbalized understanding.     Electronically signed by Tarah Nassar MA on 11/29/2023 at 10:22 AM

## 2024-02-19 ENCOUNTER — OFFICE VISIT (OUTPATIENT)
Dept: NON INVASIVE DIAGNOSTICS | Age: 64
End: 2024-02-19
Payer: COMMERCIAL

## 2024-02-19 VITALS
HEART RATE: 74 BPM | WEIGHT: 289.4 LBS | SYSTOLIC BLOOD PRESSURE: 146 MMHG | DIASTOLIC BLOOD PRESSURE: 88 MMHG | HEIGHT: 69 IN | BODY MASS INDEX: 42.86 KG/M2 | RESPIRATION RATE: 18 BRPM

## 2024-02-19 DIAGNOSIS — R94.31 EKG ABNORMALITIES: Primary | ICD-10-CM

## 2024-02-19 DIAGNOSIS — Z95.0 PACEMAKER: ICD-10-CM

## 2024-02-19 PROCEDURE — 3017F COLORECTAL CA SCREEN DOC REV: CPT | Performed by: NURSE PRACTITIONER

## 2024-02-19 PROCEDURE — 99214 OFFICE O/P EST MOD 30 MIN: CPT | Performed by: NURSE PRACTITIONER

## 2024-02-19 PROCEDURE — 3077F SYST BP >= 140 MM HG: CPT | Performed by: NURSE PRACTITIONER

## 2024-02-19 PROCEDURE — 1036F TOBACCO NON-USER: CPT | Performed by: NURSE PRACTITIONER

## 2024-02-19 PROCEDURE — 93000 ELECTROCARDIOGRAM COMPLETE: CPT | Performed by: INTERNAL MEDICINE

## 2024-02-19 PROCEDURE — 3079F DIAST BP 80-89 MM HG: CPT | Performed by: NURSE PRACTITIONER

## 2024-02-19 PROCEDURE — G8484 FLU IMMUNIZE NO ADMIN: HCPCS | Performed by: NURSE PRACTITIONER

## 2024-02-19 PROCEDURE — G8427 DOCREV CUR MEDS BY ELIG CLIN: HCPCS | Performed by: NURSE PRACTITIONER

## 2024-02-19 PROCEDURE — G8417 CALC BMI ABV UP PARAM F/U: HCPCS | Performed by: NURSE PRACTITIONER

## 2024-02-19 RX ORDER — CARVEDILOL 12.5 MG/1
12.5 TABLET ORAL 2 TIMES DAILY WITH MEALS
Qty: 60 TABLET | Refills: 3 | Status: SHIPPED | OUTPATIENT
Start: 2024-02-19

## 2024-02-19 NOTE — PROGRESS NOTES
defect in the inferior wall (with normal wall motion). Soft tissue attenuation present.    Cardiac monitor 11/29/2023:  Predominant underlying rhythm was Sinus Rhythm. 5 Supraventricular  Tachycardia runs occurred, the run with the longest lasting 8 beats  with an avg rate of 108 bpm. Isolated SVEs were rare (<1.0%).  Isolated VEs were rare (<1.0%). No VT or PAF. No pause or AV block.  Triggered event and reported symptoms were correlated with sinus  rhythm and sinus tachycardia.      EKG 02/19/2024: sinus rate 74 bpm LA 184ms QRS 95ms . - see scanned cardiology    Device Interrogation ( 02/19/2024 )  Make/Model Medtroinc Adapta*  Mode AAIR<-->DDDR 60/120  P wave: 5.6 mV  Impedance: 544 ohms   Threshold: 0.5 V @ 0.4 ms  RV R wave: 15.6 mV  Impedance: 732 ohms   Threshold: 0.5 V @ 0.4 ms  Pacing: A: 28.3%  RV: 0.3%    Battery Voltage/Longevity:  5 months     Arrhythmias: 0.2% longest AHR 18 min consistent with SVT VS AT with SVT more likely   VHR 01/12/2024 7 min induration.   Reprogramming included counters cleared.   Overall device function is normal  All device programmable settings were evaluated per above and in the scanned document, along with iterative adjustments (capture thresholds) to assess and select the most appropriate final programming to provide for consistent delivery of the appropriate therapy and to verify function of the device.      I have independently reviewed all of the ECGs and rhythm strips per above     Assessment/Plan: This is a 63 y.o. male with a history of   Patient Active Problem List   Diagnosis    Essential hypertension    Mixed hyperlipidemia    History of echocardiogram    History of exercise stress test    Pacemaker    Sinus node dysfunction (HCC)    Arthritis of left foot    who presents with pacemaker in situ.    1. Dual chamber permanent pacemaker in situ  - Implanted on 4/3/04.  - Indication: Symptomatic SND.  - Generator change on 4/27/12.   - Normal pacemaker function.  -

## 2024-02-19 NOTE — PATIENT INSTRUCTIONS
Change Toprol 50 XL to Coreg 12.5  mg twice a day with meals.   Increase device checks to monthly prior to device change.

## 2024-02-20 ENCOUNTER — TELEPHONE (OUTPATIENT)
Dept: NON INVASIVE DIAGNOSTICS | Age: 64
End: 2024-02-20

## 2024-02-20 PROCEDURE — 93280 PM DEVICE PROGR EVAL DUAL: CPT | Performed by: INTERNAL MEDICINE

## 2024-02-20 NOTE — TELEPHONE ENCOUNTER
I called Kwame and informed him we will need to have him send remotes every month since the battery on his device his nearing ALDO. He told me he was notified of this yesterday. I asked him to send a remote every month on or around the 19th. He voiced understanding.    Ramya Rueda RN, BSN  [facility] Vascular [place]   Device Clinic

## 2024-03-17 DIAGNOSIS — R94.31 EKG ABNORMALITIES: ICD-10-CM

## 2024-03-18 RX ORDER — METOPROLOL SUCCINATE 50 MG/1
50 TABLET, EXTENDED RELEASE ORAL DAILY
Qty: 30 TABLET | Refills: 3 | OUTPATIENT
Start: 2024-03-18

## 2024-03-25 PROCEDURE — 93294 REM INTERROG EVL PM/LDLS PM: CPT | Performed by: INTERNAL MEDICINE

## 2024-03-25 PROCEDURE — 93296 REM INTERROG EVL PM/IDS: CPT | Performed by: INTERNAL MEDICINE

## 2024-04-30 ENCOUNTER — PREP FOR PROCEDURE (OUTPATIENT)
Dept: NON INVASIVE DIAGNOSTICS | Age: 64
End: 2024-04-30

## 2024-04-30 ENCOUNTER — TELEPHONE (OUTPATIENT)
Dept: NON INVASIVE DIAGNOSTICS | Age: 64
End: 2024-04-30

## 2024-04-30 RX ORDER — SODIUM CHLORIDE 0.9 % (FLUSH) 0.9 %
5-40 SYRINGE (ML) INJECTION EVERY 12 HOURS SCHEDULED
Status: CANCELLED | OUTPATIENT
Start: 2024-04-30

## 2024-04-30 RX ORDER — SODIUM CHLORIDE 9 MG/ML
INJECTION, SOLUTION INTRAVENOUS PRN
Status: CANCELLED | OUTPATIENT
Start: 2024-04-30

## 2024-04-30 RX ORDER — SODIUM CHLORIDE 0.9 % (FLUSH) 0.9 %
5-40 SYRINGE (ML) INJECTION PRN
Status: CANCELLED | OUTPATIENT
Start: 2024-04-30

## 2024-04-30 NOTE — TELEPHONE ENCOUNTER
Please check prior auth.    Date:06/03/2024    Doc:Khunn[place]at    Procedure:  dual PGR - MDT    CPT: 91514    ICD: Z45.018    Electronically signed by Sharda Kaye MA on 4/30/2024 at 1:12 PM

## 2024-05-02 ENCOUNTER — TELEPHONE (OUTPATIENT)
Dept: NON INVASIVE DIAGNOSTICS | Age: 64
End: 2024-05-02

## 2024-06-03 ENCOUNTER — ANESTHESIA EVENT (OUTPATIENT)
Age: 64
End: 2024-06-03
Payer: COMMERCIAL

## 2024-06-03 ENCOUNTER — ANESTHESIA (OUTPATIENT)
Age: 64
End: 2024-06-03
Payer: COMMERCIAL

## 2024-06-03 ENCOUNTER — HOSPITAL ENCOUNTER (OUTPATIENT)
Age: 64
Setting detail: OUTPATIENT SURGERY
Discharge: HOME | End: 2024-06-03
Attending: INTERNAL MEDICINE | Admitting: INTERNAL MEDICINE
Payer: COMMERCIAL

## 2024-06-03 ENCOUNTER — APPOINTMENT (OUTPATIENT)
Dept: GENERAL RADIOLOGY | Age: 64
End: 2024-06-03
Attending: INTERNAL MEDICINE
Payer: COMMERCIAL

## 2024-06-03 VITALS
BODY MASS INDEX: 42.68 KG/M2 | DIASTOLIC BLOOD PRESSURE: 93 MMHG | RESPIRATION RATE: 17 BRPM | HEART RATE: 67 BPM | TEMPERATURE: 98 F | SYSTOLIC BLOOD PRESSURE: 179 MMHG | WEIGHT: 289 LBS | OXYGEN SATURATION: 94 %

## 2024-06-03 DIAGNOSIS — Z45.018 FITTING AND ADJUSTMENT OF CARDIAC PACEMAKER: ICD-10-CM

## 2024-06-03 PROBLEM — Z45.010 PACEMAKER AT END OF BATTERY LIFE: Status: ACTIVE | Noted: 2024-06-03

## 2024-06-03 LAB
ANION GAP SERPL CALCULATED.3IONS-SCNC: 11 MMOL/L (ref 7–16)
BUN SERPL-MCNC: 13 MG/DL (ref 6–23)
CALCIUM SERPL-MCNC: 9.1 MG/DL (ref 8.6–10.2)
CHLORIDE SERPL-SCNC: 103 MMOL/L (ref 98–107)
CO2 SERPL-SCNC: 27 MMOL/L (ref 22–29)
CREAT SERPL-MCNC: 0.7 MG/DL (ref 0.7–1.2)
EKG ATRIAL RATE: 72 BPM
EKG ATRIAL RATE: 76 BPM
EKG P AXIS: 38 DEGREES
EKG P AXIS: 50 DEGREES
EKG P-R INTERVAL: 178 MS
EKG P-R INTERVAL: 178 MS
EKG Q-T INTERVAL: 364 MS
EKG Q-T INTERVAL: 372 MS
EKG QRS DURATION: 84 MS
EKG QRS DURATION: 96 MS
EKG QTC CALCULATION (BAZETT): 398 MS
EKG QTC CALCULATION (BAZETT): 418 MS
EKG R AXIS: -26 DEGREES
EKG R AXIS: -29 DEGREES
EKG T AXIS: 13 DEGREES
EKG T AXIS: 7 DEGREES
EKG VENTRICULAR RATE: 72 BPM
EKG VENTRICULAR RATE: 76 BPM
ERYTHROCYTE [DISTWIDTH] IN BLOOD BY AUTOMATED COUNT: 13.2 % (ref 11.5–15)
GFR, ESTIMATED: >90 ML/MIN/1.73M2
GLUCOSE SERPL-MCNC: 111 MG/DL (ref 74–99)
HCT VFR BLD AUTO: 48.5 % (ref 37–54)
HGB BLD-MCNC: 15.8 G/DL (ref 12.5–16.5)
MCH RBC QN AUTO: 30.3 PG (ref 26–35)
MCHC RBC AUTO-ENTMCNC: 32.6 G/DL (ref 32–34.5)
MCV RBC AUTO: 93.1 FL (ref 80–99.9)
PLATELET # BLD AUTO: 265 K/UL (ref 130–450)
PMV BLD AUTO: 9.8 FL (ref 7–12)
POTASSIUM SERPL-SCNC: 4 MMOL/L (ref 3.5–5)
RBC # BLD AUTO: 5.21 M/UL (ref 3.8–5.8)
SODIUM SERPL-SCNC: 141 MMOL/L (ref 132–146)
WBC OTHER # BLD: 8.8 K/UL (ref 4.5–11.5)

## 2024-06-03 PROCEDURE — C1889 IMPLANT/INSERT DEVICE, NOC: HCPCS | Performed by: INTERNAL MEDICINE

## 2024-06-03 PROCEDURE — 3700000001 HC ADD 15 MINUTES (ANESTHESIA): Performed by: INTERNAL MEDICINE

## 2024-06-03 PROCEDURE — 33222 RELOCATION POCKET PACEMAKER: CPT | Performed by: INTERNAL MEDICINE

## 2024-06-03 PROCEDURE — 7100000011 HC PHASE II RECOVERY - ADDTL 15 MIN: Performed by: INTERNAL MEDICINE

## 2024-06-03 PROCEDURE — 33228 REMV&REPLC PM GEN DUAL LEAD: CPT | Performed by: INTERNAL MEDICINE

## 2024-06-03 PROCEDURE — 33208 INSRT HEART PM ATRIAL & VENT: CPT | Performed by: INTERNAL MEDICINE

## 2024-06-03 PROCEDURE — 3700000000 HC ANESTHESIA ATTENDED CARE: Performed by: INTERNAL MEDICINE

## 2024-06-03 PROCEDURE — 2580000003 HC RX 258: Performed by: INTERNAL MEDICINE

## 2024-06-03 PROCEDURE — 6360000002 HC RX W HCPCS

## 2024-06-03 PROCEDURE — C1785 PMKR, DUAL, RATE-RESP: HCPCS | Performed by: INTERNAL MEDICINE

## 2024-06-03 PROCEDURE — 2500000003 HC RX 250 WO HCPCS: Performed by: INTERNAL MEDICINE

## 2024-06-03 PROCEDURE — 2720000010 HC SURG SUPPLY STERILE: Performed by: INTERNAL MEDICINE

## 2024-06-03 PROCEDURE — 2709999900 HC NON-CHARGEABLE SUPPLY: Performed by: INTERNAL MEDICINE

## 2024-06-03 PROCEDURE — 71045 X-RAY EXAM CHEST 1 VIEW: CPT

## 2024-06-03 PROCEDURE — 80048 BASIC METABOLIC PNL TOTAL CA: CPT

## 2024-06-03 PROCEDURE — 93005 ELECTROCARDIOGRAM TRACING: CPT | Performed by: INTERNAL MEDICINE

## 2024-06-03 PROCEDURE — 7100000010 HC PHASE II RECOVERY - FIRST 15 MIN: Performed by: INTERNAL MEDICINE

## 2024-06-03 PROCEDURE — 85027 COMPLETE CBC AUTOMATED: CPT

## 2024-06-03 DEVICE — IPG W1DR01 AZURE XT DR MRI USA
Type: IMPLANTABLE DEVICE | Site: CHEST  WALL | Status: FUNCTIONAL
Brand: AZURE™ XT DR MRI SURESCAN™

## 2024-06-03 DEVICE — ENVELOPE CMRM6122 ABSORB MED MR
Type: IMPLANTABLE DEVICE | Site: CHEST  WALL | Status: FUNCTIONAL
Brand: TYRX™

## 2024-06-03 RX ORDER — SODIUM CHLORIDE 9 MG/ML
INJECTION, SOLUTION INTRAVENOUS PRN
Status: DISCONTINUED | OUTPATIENT
Start: 2024-06-03 | End: 2024-06-03 | Stop reason: HOSPADM

## 2024-06-03 RX ORDER — SODIUM CHLORIDE 0.9 % (FLUSH) 0.9 %
5-40 SYRINGE (ML) INJECTION EVERY 12 HOURS SCHEDULED
Status: DISCONTINUED | OUTPATIENT
Start: 2024-06-03 | End: 2024-06-03 | Stop reason: HOSPADM

## 2024-06-03 RX ORDER — PROPOFOL 10 MG/ML
INJECTION, EMULSION INTRAVENOUS CONTINUOUS PRN
Status: DISCONTINUED | OUTPATIENT
Start: 2024-06-03 | End: 2024-06-03 | Stop reason: SDUPTHER

## 2024-06-03 RX ORDER — ONDANSETRON 2 MG/ML
4 INJECTION INTRAMUSCULAR; INTRAVENOUS EVERY 6 HOURS PRN
Status: DISCONTINUED | OUTPATIENT
Start: 2024-06-03 | End: 2024-06-03 | Stop reason: HOSPADM

## 2024-06-03 RX ORDER — CEFAZOLIN SODIUM 1 G/3ML
INJECTION, POWDER, FOR SOLUTION INTRAMUSCULAR; INTRAVENOUS PRN
Status: DISCONTINUED | OUTPATIENT
Start: 2024-06-03 | End: 2024-06-03 | Stop reason: SDUPTHER

## 2024-06-03 RX ORDER — DOXYCYCLINE HYCLATE 100 MG
100 TABLET ORAL 2 TIMES DAILY
Qty: 14 TABLET | Refills: 0 | Status: SHIPPED | OUTPATIENT
Start: 2024-06-03 | End: 2024-06-10

## 2024-06-03 RX ORDER — MIDAZOLAM HYDROCHLORIDE 1 MG/ML
INJECTION INTRAMUSCULAR; INTRAVENOUS PRN
Status: DISCONTINUED | OUTPATIENT
Start: 2024-06-03 | End: 2024-06-03 | Stop reason: SDUPTHER

## 2024-06-03 RX ORDER — FENTANYL CITRATE 50 UG/ML
INJECTION, SOLUTION INTRAMUSCULAR; INTRAVENOUS PRN
Status: DISCONTINUED | OUTPATIENT
Start: 2024-06-03 | End: 2024-06-03 | Stop reason: SDUPTHER

## 2024-06-03 RX ORDER — SODIUM CHLORIDE 0.9 % (FLUSH) 0.9 %
5-40 SYRINGE (ML) INJECTION PRN
Status: DISCONTINUED | OUTPATIENT
Start: 2024-06-03 | End: 2024-06-03 | Stop reason: HOSPADM

## 2024-06-03 RX ADMIN — SODIUM CHLORIDE: 9 INJECTION, SOLUTION INTRAVENOUS at 11:59

## 2024-06-03 RX ADMIN — FENTANYL CITRATE 50 MCG: 50 INJECTION, SOLUTION INTRAMUSCULAR; INTRAVENOUS at 12:10

## 2024-06-03 RX ADMIN — FENTANYL CITRATE 50 MCG: 50 INJECTION, SOLUTION INTRAMUSCULAR; INTRAVENOUS at 12:18

## 2024-06-03 RX ADMIN — FENTANYL CITRATE 50 MCG: 50 INJECTION, SOLUTION INTRAMUSCULAR; INTRAVENOUS at 12:27

## 2024-06-03 RX ADMIN — CEFAZOLIN 3 G: 1 INJECTION, POWDER, FOR SOLUTION INTRAMUSCULAR; INTRAVENOUS at 12:19

## 2024-06-03 RX ADMIN — MIDAZOLAM 2 MG: 1 INJECTION INTRAMUSCULAR; INTRAVENOUS at 12:07

## 2024-06-03 RX ADMIN — PROPOFOL 15 MCG/KG/MIN: 10 INJECTION, EMULSION INTRAVENOUS at 12:10

## 2024-06-03 ASSESSMENT — LIFESTYLE VARIABLES: SMOKING_STATUS: 0

## 2024-06-03 NOTE — ANESTHESIA PRE PROCEDURE
Department of Anesthesiology  Preprocedure Note       Name:  Kwame Umanzor   Age:  63 y.o.  :  1960                                          MRN:  [id number]         Date:  6/3/2024      Surgeon: Surgeon(s):  Tracee Gatica MD    Procedure: Procedure(s):  Remove & replace PPM gen dual lead    Medications prior to admission:   Prior to Admission medications    Medication Sig Start Date End Date Taking? Authorizing Provider   carvedilol (COREG) 12.5 MG tablet Take 1 tablet by mouth 2 times daily (with meals) 24   Dany Cox, APRN - CNP   diclofenac sodium (VOLTAREN) 1 % GEL Apply topically 2 times daily  Patient not taking: Reported on 6/3/2024    Deepika Faye MD   meloxicam (MOBIC) 15 MG tablet  23   Deepika Faye MD   tamsulosin (FLOMAX) 0.4 MG capsule Take 1 capsule by mouth daily 18   Deepika Faye MD   aspirin 81 MG tablet Take 1 tablet by mouth daily  Patient not taking: Reported on 6/3/2024    Deepika Faye MD   rosuvastatin (CRESTOR) 10 MG tablet Take 1 tablet by mouth daily    Deepika Faye MD   Multiple Vitamins-Minerals (THERAPEUTIC MULTIVITAMIN-MINERALS) tablet Take 1 tablet by mouth daily    Deepika Faye MD       Current medications:    Current Facility-Administered Medications   Medication Dose Route Frequency Provider Last Rate Last Admin   • sodium chloride flush 0.9 % injection 5-40 mL  5-40 mL IntraVENous 2 times per day Tracee Gatica MD       • sodium chloride flush 0.9 % injection 5-40 mL  5-40 mL IntraVENous PRN Tracee Gatica MD       • 0.9 % sodium chloride infusion   IntraVENous PRN Tracee Gatica MD           Allergies:  No Known Allergies    Problem List:    Patient Active Problem List   Diagnosis Code   • Essential hypertension I10   • Mixed hyperlipidemia E78.2   • History of echocardiogram Z92.89   • History of exercise stress test Z92.89   • Pacemaker Z95.0   • Sinus node

## 2024-06-03 NOTE — H&P
[facility]YS[facility]- [facility]  Outpatient Progress Note  PATIENT: Kwame Umanzor  MEDICAL RECORD NUMBER: [id number]  DATE OF SERVICE:  6/3/2024  ATTENDING ELECTROPHYSIOLOGIST: Tracee Gatica MD  REFERRING PHYSICIAN: Ino Brewer MD  CHIEF COMPLAINT: Pacemaker in situ     HPI: This is a 63 y.o. male with a history of   Patient Active Problem List   Diagnosis    Essential hypertension    Mixed hyperlipidemia    History of echocardiogram    History of exercise stress test    Pacemaker    Sinus node dysfunction (HCC)    Arthritis of left foot   who presents to cardiac electrophysiology clinic for follow-up and management of dual chamber permanent pacemaker management secondary to sinus node dysfunction. When last seen in clinic he complained of palpations and was started on Toprol. He was also given a monitor that showed palpitation corrolate with NSR, and a TTE was completed that showed a normal LVEF. He is not compliant with remotes and notes ongoing episodes of palpitations at home as well as hypertension with an average systolic blood pressure in the 170s, and intermittent feelings of lightheadedness.  Today's previous cardiac testing was discussed as well as the status of his battery and pacemaker generator change.  The device site is well-healed without erosion or migration.         Patient Active Problem List    Diagnosis Date Noted    Arthritis of left foot 12/15/2021    Sinus node dysfunction (HCC) 03/10/2021    Essential hypertension 05/23/2018    Mixed hyperlipidemia 05/23/2018    History of echocardiogram 05/23/2018     Overview Note:     A. Echo 4/2017 OSH): EF 55%, no valve disease      History of exercise stress test 05/23/2018     Overview Note:     A. Negative ETT-^ 2016 (OSH)      Pacemaker 05/23/2018     Overview Note:     A. Original implant 2004,   Medtronic Adapta upgrade 4/27/2012 (OSH): for \"symptomatic 4.5 second pause\"         Past

## 2024-06-03 NOTE — DISCHARGE INSTRUCTIONS
[facility] Electrophysiology Pacemaker Generator Change Patient Discharge Instructions      Medications:  Please resume your normal medication regimen as you are currently taking.  A prescription for an antibiotic has been sent to your pharmacy.    Follow-Up: You will be seen on Tuesday 6/18/24 at 9:00 am at the Device Clinic  for an incision check and brief pacemaker evaluation.      Incision Care:   You may have a white pressure dressing over your incision, if you do, remove it in 24 hours.  Aquacel dressing: Please leave the brown Aquacel dressing on for 7 days. Remove the AquaCel dressing on Monday 6/10/24.  Steri strips: They look like white strips of tape over your incision. DO NOT REMOVE the steri strips, they will either fall off on their own or be removed at your follow up appointment.  Check the incision everyday: If you find any redness, warmth, swelling, drainage, fever greater than 100 degrees, or chills notify the office immediately at [phone].  No lotions, powders, or creams to incision.    Bathing: You may shower starting tomorrow just make sure no water runs directly over the incision for 7 days (once the dressing has been removed). No submerging in water (bath, swimming pool, hot tub..) until wound is completely healed.    Activity: You may continue regular daily activities tomorrow using caution for 1 week with the arm closest to your pacemaker. No extreme movements or stretching. No blows to the site or seatbelt/straps rubbing, cushion for your comfort.     ID Card: You will have a temporary ID card until a permanent card is sent to you by the device company. The permanent card will look like a ’s license or credit card and should arrive within 8 weeks. Carry your ID card with you at all times.     [place] [facility]  [address] [place] [address] [place]  [address]  [phone]

## 2024-06-04 NOTE — ANESTHESIA POSTPROCEDURE EVALUATION
Department of Anesthesiology  Postprocedure Note    Patient: Kwame Umanzor  MRN: [id number]  YOB: 1960  Date of evaluation: 6/4/2024    Procedure Summary       Date: 06/03/24 Room / Location: [facility] EP LAB 1 / [facility] CARDIAC CATH LAB    Anesthesia Start: 1159 Anesthesia Stop: 1306    Procedure: Remove & replace PPM gen dual lead Diagnosis:       Fitting and adjustment of cardiac pacemaker      (Fitting and adjustment of cardiac pacemaker [Z45.018])    Providers: Tracee Gatica MD Responsible Provider: Chantel Champagne MD    Anesthesia Type: MAC ASA Status: 3            Anesthesia Type: No value filed.    Cam Phase I:      Cam Phase II:      Anesthesia Post Evaluation    Patient location during evaluation: PACU  Patient participation: complete - patient participated  Level of consciousness: awake and alert  Airway patency: patent  Nausea & Vomiting: no nausea and no vomiting  Cardiovascular status: blood pressure returned to baseline and hemodynamically stable  Respiratory status: acceptable and spontaneous ventilation  Hydration status: euvolemic  Multimodal analgesia pain management approach  Pain management: adequate    There were no known notable events for this encounter.

## 2024-06-18 ENCOUNTER — NURSE ONLY (OUTPATIENT)
Dept: NON INVASIVE DIAGNOSTICS | Age: 64
End: 2024-06-18
Payer: COMMERCIAL

## 2024-06-18 DIAGNOSIS — Z95.0 PACEMAKER: Primary | ICD-10-CM

## 2024-06-18 DIAGNOSIS — I49.5 SINUS NODE DYSFUNCTION (HCC): ICD-10-CM

## 2024-06-18 PROCEDURE — 93280 PM DEVICE PROGR EVAL DUAL: CPT | Performed by: INTERNAL MEDICINE

## 2024-06-18 NOTE — PATIENT INSTRUCTIONS
You may shower starting now    Call if any signs or symptoms of infection [phone] ext: [phone]  Fevers, chills, redness, swelling or drainage.       Will verify dose of Coreg. This medication you will stay on indefinitely      Hook up home  Monitor      [employer] Stay connected: [phone]

## 2024-06-18 NOTE — PROGRESS NOTES
See Murj report.    Ramya Rueda RN, BSN  [facility] [place]   Device Clinic

## 2024-06-19 ENCOUNTER — TELEPHONE (OUTPATIENT)
Dept: NON INVASIVE DIAGNOSTICS | Age: 64
End: 2024-06-19

## 2024-06-19 RX ORDER — CARVEDILOL 12.5 MG/1
12.5 TABLET ORAL 2 TIMES DAILY WITH MEALS
Qty: 60 TABLET | Refills: 3 | Status: SHIPPED | OUTPATIENT
Start: 2024-06-19

## 2024-06-19 NOTE — TELEPHONE ENCOUNTER
----- Message from Tracee Gatica MD sent at 6/18/2024  4:10 PM EDT -----  Regarding: RE: brief episodes of SVT and NSVT  Please make sure that he refills his Coreg. Thanks.  ----- Message -----  From: Ramya Rueda RN  Sent: 6/18/2024   3:42 PM EDT  To: Tracee Gatica MD  Subject: brief episodes of SVT and NSVT                   Non-sustained Ventricular Tachycardia  1  · Stored EGMs are consistent with or suggestive of Non-sustained VT  · Total episodes: 1  Additional Notes:  One episode lasting 5 beats with ventricular rate 188. Patient did not get the Coreg refilled when the bottle was empty. According to Dany's note, Coreg dose is 12.5 mg bid      Tachycardia: SVT  1  · Stored EGMs are consistent with or suggestive of Supraventricular Tachycardia  · AT burden: %  · Total number of events: 1  Additional Notes:  Episode lasted 5 seconds with rate of 167

## 2024-06-19 NOTE — TELEPHONE ENCOUNTER
I notified patient's wife, Sosa, Dr. Gatica wanted Hayden to restart Coreg. Patient did get medication yesterday.     Ramya Rueda RN, BSN  [facility] Heart [facility] V[facility] [place]   Device Clinic

## 2024-08-13 PROBLEM — E78.5 HYPERLIPIDEMIA: Status: ACTIVE | Noted: 2018-05-23

## 2024-08-13 PROBLEM — G47.30 SLEEP APNEA: Status: ACTIVE | Noted: 2024-08-13

## 2024-08-13 PROBLEM — E55.9 VITAMIN D DEFICIENCY: Status: ACTIVE | Noted: 2024-08-13

## 2024-09-09 ENCOUNTER — OFFICE VISIT (OUTPATIENT)
Dept: NON INVASIVE DIAGNOSTICS | Age: 64
End: 2024-09-09
Payer: COMMERCIAL

## 2024-09-09 VITALS
BODY MASS INDEX: 42.01 KG/M2 | WEIGHT: 277.2 LBS | DIASTOLIC BLOOD PRESSURE: 80 MMHG | HEIGHT: 68 IN | RESPIRATION RATE: 16 BRPM | SYSTOLIC BLOOD PRESSURE: 150 MMHG | HEART RATE: 75 BPM

## 2024-09-09 DIAGNOSIS — Z95.0 PACEMAKER: Primary | ICD-10-CM

## 2024-09-09 DIAGNOSIS — I10 ESSENTIAL HYPERTENSION: ICD-10-CM

## 2024-09-09 DIAGNOSIS — I49.5 SINUS NODE DYSFUNCTION (HCC): ICD-10-CM

## 2024-09-09 DIAGNOSIS — Z95.0 PRESENCE OF CARDIAC PACEMAKER: ICD-10-CM

## 2024-09-09 PROCEDURE — 1036F TOBACCO NON-USER: CPT | Performed by: NURSE PRACTITIONER

## 2024-09-09 PROCEDURE — 3079F DIAST BP 80-89 MM HG: CPT | Performed by: NURSE PRACTITIONER

## 2024-09-09 PROCEDURE — 3017F COLORECTAL CA SCREEN DOC REV: CPT | Performed by: NURSE PRACTITIONER

## 2024-09-09 PROCEDURE — 3077F SYST BP >= 140 MM HG: CPT | Performed by: NURSE PRACTITIONER

## 2024-09-09 PROCEDURE — G8427 DOCREV CUR MEDS BY ELIG CLIN: HCPCS | Performed by: NURSE PRACTITIONER

## 2024-09-09 PROCEDURE — 99213 OFFICE O/P EST LOW 20 MIN: CPT | Performed by: NURSE PRACTITIONER

## 2024-09-09 PROCEDURE — G8417 CALC BMI ABV UP PARAM F/U: HCPCS | Performed by: NURSE PRACTITIONER

## 2024-09-23 PROCEDURE — 93296 REM INTERROG EVL PM/IDS: CPT | Performed by: INTERNAL MEDICINE

## 2024-09-23 PROCEDURE — 93294 REM INTERROG EVL PM/LDLS PM: CPT | Performed by: INTERNAL MEDICINE

## 2024-10-25 ENCOUNTER — HOSPITAL ENCOUNTER (OUTPATIENT)
Age: 64
Discharge: HOME | End: 2024-10-27

## 2024-10-25 LAB
ABO + RH BLD: NORMAL
ARM BAND NUMBER: NORMAL
BLOOD BANK SAMPLE EXPIRATION: NORMAL
BLOOD GROUP ANTIBODIES SERPL: NEGATIVE

## 2024-10-25 PROCEDURE — 87081 CULTURE SCREEN ONLY: CPT

## 2024-10-25 PROCEDURE — 86850 RBC ANTIBODY SCREEN: CPT

## 2024-10-25 PROCEDURE — 86900 BLOOD TYPING SEROLOGIC ABO: CPT

## 2024-10-25 PROCEDURE — 86901 BLOOD TYPING SEROLOGIC RH(D): CPT

## 2024-10-27 LAB
MICROORGANISM SPEC CULT: NORMAL
SPECIMEN DESCRIPTION: NORMAL

## 2024-10-30 ENCOUNTER — HOSPITAL ENCOUNTER (OUTPATIENT)
Age: 64
Discharge: HOME | End: 2024-11-01

## 2024-10-30 LAB
ANION GAP SERPL CALCULATED.3IONS-SCNC: 11 MMOL/L (ref 7–16)
BUN SERPL-MCNC: 16 MG/DL (ref 6–23)
CALCIUM SERPL-MCNC: 8.6 MG/DL (ref 8.6–10.2)
CHLORIDE SERPL-SCNC: 102 MMOL/L (ref 98–107)
CO2 SERPL-SCNC: 25 MMOL/L (ref 22–29)
CREAT SERPL-MCNC: 0.7 MG/DL (ref 0.7–1.2)
ERYTHROCYTE [DISTWIDTH] IN BLOOD BY AUTOMATED COUNT: 12.8 % (ref 11.5–15)
GFR, ESTIMATED: >90 ML/MIN/1.73M2
GLUCOSE SERPL-MCNC: 156 MG/DL (ref 74–99)
HCT VFR BLD AUTO: 41.4 % (ref 37–54)
HGB BLD-MCNC: 13.4 G/DL (ref 12.5–16.5)
MCH RBC QN AUTO: 30.5 PG (ref 26–35)
MCHC RBC AUTO-ENTMCNC: 32.4 G/DL (ref 32–34.5)
MCV RBC AUTO: 94.3 FL (ref 80–99.9)
PLATELET # BLD AUTO: 245 K/UL (ref 130–450)
PMV BLD AUTO: 9.7 FL (ref 7–12)
POTASSIUM SERPL-SCNC: 4.3 MMOL/L (ref 3.5–5)
RBC # BLD AUTO: 4.39 M/UL (ref 3.8–5.8)
SODIUM SERPL-SCNC: 138 MMOL/L (ref 132–146)
WBC OTHER # BLD: 14.2 K/UL (ref 4.5–11.5)

## 2024-10-30 PROCEDURE — 85027 COMPLETE CBC AUTOMATED: CPT

## 2024-10-30 PROCEDURE — 80048 BASIC METABOLIC PNL TOTAL CA: CPT

## 2024-12-24 PROCEDURE — 93294 REM INTERROG EVL PM/LDLS PM: CPT | Performed by: INTERNAL MEDICINE

## 2024-12-24 PROCEDURE — 93296 REM INTERROG EVL PM/IDS: CPT | Performed by: INTERNAL MEDICINE

## 2025-03-25 PROCEDURE — 93294 REM INTERROG EVL PM/LDLS PM: CPT | Performed by: INTERNAL MEDICINE

## 2025-03-25 PROCEDURE — 93296 REM INTERROG EVL PM/IDS: CPT | Performed by: INTERNAL MEDICINE

## 2025-06-30 PROCEDURE — 93294 REM INTERROG EVL PM/LDLS PM: CPT | Performed by: INTERNAL MEDICINE

## 2025-06-30 PROCEDURE — 93296 REM INTERROG EVL PM/IDS: CPT | Performed by: INTERNAL MEDICINE

## (undated) DEVICE — PLASMABLADE PS210-030S 3.0S LOCK: Brand: PLASMABLADE™

## (undated) DEVICE — PAD, DEFIB, ADULT, RADIOTRAN, PHYSIO, LO: Brand: MEDLINE

## (undated) DEVICE — AGENT HEMOSTATIC SURGIFLOW MATRIX KIT W/THROMBIN